# Patient Record
Sex: FEMALE | Race: WHITE
[De-identification: names, ages, dates, MRNs, and addresses within clinical notes are randomized per-mention and may not be internally consistent; named-entity substitution may affect disease eponyms.]

---

## 2017-03-13 NOTE — NUR
PREBOOK.WARRANT OF ARREST.NEED MEDICAL CLEARANCE.HX: HEP.C,ASTHMA  . DENIES 
N/V/D; SKIN IS PINK/WARM/DRY; AAOX4 WITH EVEN AND STEADY GAIT; LUNGS CLEAR BL; 
HR EVEN AND REGULAR; PT DENIES ANY FEVER, CP, SOB, OR COUGH AT THIS TIME; 
PATIENT STATES PAIN OF 08/10 AT THIS TIME; PATIENT POSITIONED FOR COMFORT; HOB 
ELEVATED; BEDRAILS UP X2; BED DOWN.

## 2017-03-13 NOTE — NUR
Patient discharged with v/s stable. Written and verbal after care instructions 
given and explained. 

Patient verbalized understanding. Ambulatory with steady gait. All questions 
addressed prior to discharge. Advised to follow up with PMD.ASSISTED BY PD

## 2017-03-13 NOTE — NUR
PT VERBALIZED CAN I TALK TO THE DOCTOR AGAIN AND CHECK MY MOUTH, INFORMED DR. TRINH WANTS TO TALK TO HIM AGAIN.

## 2017-10-14 NOTE — NUR
56 F BIBA WITH C/O L KNEE PAIN AND BL LEG SWELLING; PER PARAMEDIC, PT WAS FOUND 
IN FRONT OF STORE, AWAKE AND ON THE FLOOR; ACCORDING TO PT, PT TRIPPED, FALL, 
AND LANDED ON HER KNEES; PT IS AOX4; PT DENIES ANY LOC OR PAIN IN HER NECK OR 
NECK; SKIN TEAR TO TOP OF HEAD AND L KNEE; NON PITTING SWELLING TO BL KNEES ; 
DENIES N/V/D; NO ACUTE NEURO DEFICITS NOTED; RR ARE EVEN AND UNLABORED; VSS; 
PATIENT POSITIONED FOR COMFORT; HOB ELEVATED; BED DOWN. NAD. WILL CONTINUE TO 
MONITOR.

## 2017-10-14 NOTE — NUR
Patient discharged with v/s stable. Written and verbal after care instructions 
given and explained. 

Patient verbalized understanding. Pt given bus voucher and provided pt homeless 
resources packet. Pt verbalized understanding of homeless resources packet. 
Security by bedside to help pt with belongings; All belongings sent with pt. Pt 
verbalized that we give all belonging back; All questions addressed prior to 
discharge. Advised to follow up with PMD.

## 2018-01-16 ENCOUNTER — HOSPITAL ENCOUNTER (EMERGENCY)
Dept: HOSPITAL 26 - MED | Age: 58
Discharge: HOME | End: 2018-01-16
Payer: MEDICAID

## 2018-01-16 VITALS — SYSTOLIC BLOOD PRESSURE: 170 MMHG | DIASTOLIC BLOOD PRESSURE: 92 MMHG

## 2018-01-16 VITALS — WEIGHT: 115 LBS | HEIGHT: 64 IN | BODY MASS INDEX: 19.63 KG/M2

## 2018-01-16 VITALS — DIASTOLIC BLOOD PRESSURE: 92 MMHG | SYSTOLIC BLOOD PRESSURE: 170 MMHG

## 2018-01-16 DIAGNOSIS — M25.511: ICD-10-CM

## 2018-01-16 DIAGNOSIS — J45.909: ICD-10-CM

## 2018-01-16 DIAGNOSIS — Z88.1: ICD-10-CM

## 2018-01-16 DIAGNOSIS — M54.5: ICD-10-CM

## 2018-01-16 DIAGNOSIS — I10: ICD-10-CM

## 2018-01-16 DIAGNOSIS — S42.001D: ICD-10-CM

## 2018-01-16 DIAGNOSIS — Z02.89: Primary | ICD-10-CM
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
## 2018-01-16 NOTE — NUR
56 YO FEMALE [facility] FOR PREBOOK EXAM, AWAKE AND ALERT ABLE TO 
AMBULATE HS MULTIPLE MEDICAL COMPLAINTS, MOSTLY CHRONIC IN NATURE.